# Patient Record
Sex: FEMALE
[De-identification: names, ages, dates, MRNs, and addresses within clinical notes are randomized per-mention and may not be internally consistent; named-entity substitution may affect disease eponyms.]

---

## 2018-12-29 ENCOUNTER — HOSPITAL ENCOUNTER (EMERGENCY)
Dept: HOSPITAL 42 - ED | Age: 23
Discharge: HOME | End: 2018-12-29
Payer: MEDICAID

## 2018-12-29 VITALS — HEART RATE: 73 BPM | DIASTOLIC BLOOD PRESSURE: 78 MMHG | OXYGEN SATURATION: 99 % | SYSTOLIC BLOOD PRESSURE: 110 MMHG

## 2018-12-29 VITALS — TEMPERATURE: 97.9 F | RESPIRATION RATE: 18 BRPM

## 2018-12-29 DIAGNOSIS — O03.9: Primary | ICD-10-CM

## 2018-12-29 LAB
ALBUMIN SERPL-MCNC: 4.3 G/DL (ref 3–4.8)
ALBUMIN/GLOB SERPL: 1.4 {RATIO} (ref 1.1–1.8)
ALT SERPL-CCNC: 27 U/L (ref 7–56)
APPEARANCE UR: CLEAR
APTT BLD: 27.9 SECONDS (ref 25.1–36.5)
AST SERPL-CCNC: 17 U/L (ref 14–36)
BASOPHILS # BLD AUTO: 0.01 K/MM3 (ref 0–2)
BASOPHILS NFR BLD: 0.1 % (ref 0–3)
BILIRUB UR-MCNC: NEGATIVE MG/DL
BUN SERPL-MCNC: 9 MG/DL (ref 7–21)
CALCIUM SERPL-MCNC: 9 MG/DL (ref 8.4–10.5)
COLOR UR: YELLOW
EOSINOPHIL # BLD: 0.1 10*3/UL (ref 0–0.7)
EOSINOPHIL NFR BLD: 1.7 % (ref 1.5–5)
ERYTHROCYTE [DISTWIDTH] IN BLOOD BY AUTOMATED COUNT: 12.8 % (ref 11.5–14.5)
GFR NON-AFRICAN AMERICAN: > 60
GLUCOSE UR STRIP-MCNC: NEGATIVE MG/DL
GRANULOCYTES # BLD: 5.01 10*3/UL (ref 1.4–6.5)
GRANULOCYTES NFR BLD: 71.6 % (ref 50–68)
HCG,QUALITATIVE URINE: POSITIVE
HGB BLD-MCNC: 12.9 G/DL (ref 12–16)
INR PPP: 1.41
LEUKOCYTE ESTERASE UR-ACNC: NEGATIVE LEU/UL
LYMPHOCYTES # BLD: 1.4 10*3/UL (ref 1.2–3.4)
LYMPHOCYTES NFR BLD AUTO: 20.6 % (ref 22–35)
MCH RBC QN AUTO: 29.5 PG (ref 25–35)
MCHC RBC AUTO-ENTMCNC: 33.4 G/DL (ref 31–37)
MCV RBC AUTO: 88.3 FL (ref 80–105)
MONOCYTES # BLD AUTO: 0.4 10*3/UL (ref 0.1–0.6)
MONOCYTES NFR BLD: 6 % (ref 1–6)
PH UR STRIP: 6 [PH] (ref 4.7–8)
PLATELET # BLD: 200 10^3/UL (ref 120–450)
PMV BLD AUTO: 10.3 FL (ref 7–11)
PROT UR STRIP-MCNC: (no result) MG/DL
PROTHROMBIN TIME: 16.3 SECONDS (ref 9.4–12.5)
RBC # BLD AUTO: 4.37 10^6/UL (ref 3.5–6.1)
RBC # UR STRIP: (no result) /UL
RBC #/AREA URNS HPF: (no result) /HPF (ref 0–2)
SP GR UR STRIP: >= 1.03 (ref 1–1.03)
UROBILINOGEN UR STRIP-ACNC: 0.2 E.U./DL
WBC # BLD AUTO: 7 10^3/UL (ref 4.5–11)

## 2018-12-29 PROCEDURE — 99281 EMR DPT VST MAYX REQ PHY/QHP: CPT

## 2018-12-29 PROCEDURE — 85610 PROTHROMBIN TIME: CPT

## 2018-12-29 PROCEDURE — 81001 URINALYSIS AUTO W/SCOPE: CPT

## 2018-12-29 PROCEDURE — 84703 CHORIONIC GONADOTROPIN ASSAY: CPT

## 2018-12-29 PROCEDURE — 86900 BLOOD TYPING SEROLOGIC ABO: CPT

## 2018-12-29 PROCEDURE — 86850 RBC ANTIBODY SCREEN: CPT

## 2018-12-29 PROCEDURE — 85025 COMPLETE CBC W/AUTO DIFF WBC: CPT

## 2018-12-29 PROCEDURE — 85730 THROMBOPLASTIN TIME PARTIAL: CPT

## 2018-12-29 PROCEDURE — 76817 TRANSVAGINAL US OBSTETRIC: CPT

## 2018-12-29 PROCEDURE — 80053 COMPREHEN METABOLIC PANEL: CPT

## 2018-12-29 PROCEDURE — 84702 CHORIONIC GONADOTROPIN TEST: CPT

## 2018-12-29 NOTE — US
Date of service: 



12/29/2018



PROCEDURE:  OB Pelvic Ultrasound



HISTORY:

pregnant and bleeding



LMP: 11/05/2018



COMPARISON:

None available.



FINDINGS:



UTERUS:

Uterus measures 8.5 x 4.0 x 4.6 cm. Normal in size and appearance.  

No intrauterine gestational sac.  Endometrium measures 0.9 cm. 



CERVIX:

Long and closed. No cervical abnormality seen.



RIGHT OVARY:

Measures 3.6 x 2.7 x 2.9 cm. No mass lesion. Normal flow. 



LEFT OVARY:

Measures 3.5 x 1.9 x 2.6 cm. No solid mass. Normal flow. 



FREE FLUID:

None.



OTHER FINDINGS:

None. 



IMPRESSION:

No evidence of intrauterine gestation.  Findings may represent early 

normal/abnormal pregnancy with ectopic pregnancy not excluded.  Close 

clinical follow-up with serial pelvic sonography and serum beta HCG 

levels is recommended.

## 2018-12-29 NOTE — ED PDOC
Arrival/HPI





- General


Chief Complaint: Abdominal Pain


Time Seen by Provider: 18 08:43


Historian: Patient





- History of Present Illness


Narrative History of Present Illness (Text): 


18 08:47


Tammy Brown is a 23 year old female, with no significant past medical 

history,  P:0, currently 6 weeks pregnant, who presents to the Emergency 

department for vaginal spotting. Patient [place] [facility] [place] for vaginal bleeding, had a 

transvaginal US performed, and was discharged/advised to follow-up with OBGYN. 

Patient states she continues to have some vaginal spotting with associated mild 

lower abdominal cramping, became concerned, and came in for further evaluation. 

Patient denies any fever, nausea, vomiting, urinary symptoms, back pain, 

headache, dizziness, or any other complaints.


Symptom Onset: Gradual


Symptom Course: Unchanged


Activities at Onset: Light


Context: Home





Past Medical History





- Provider Review


Nursing Documentation Reviewed: Yes





- Psychiatric


Hx Substance Use: No





Family/Social History





- Physician Review


Nursing Documentation Reviewed: Yes


Family/Social History: Unknown Family HX


Smoking Status: Former Smoker


Hx Alcohol Use: Yes


Frequency of alcohol use: Socially


Hx Substance Use: No





Allergies/Home Meds


Allergies/Adverse Reactions: 


Allergies





No Known Allergies Allergy (Verified 18 08:40)


   








Home Medications: 


                                    Home Meds











 Medication  Instructions  Recorded  Confirmed


 


RX: No Known Home Med  18














Review of Systems





- Physician Review


All systems were reviewed & negative as marked: Yes





- Review of Systems


Constitutional: Normal.  absent: Fevers


Eyes: Normal


ENT: Normal


Respiratory: Normal.  absent: SOB, Cough


Cardiovascular: Normal.  absent: Chest Pain


Gastrointestinal: Abdominal Pain (+mild lower abdominal cramping).  absent: 

Diarrhea, Nausea, Vomiting


Genitourinary Female: Vaginal Bleeding


Musculoskeletal: Normal.  absent: Back Pain, Neck Pain


Skin: Normal.  absent: Rash


Neurological: Normal.  absent: Headache, Dizziness


Endocrine: Normal


Hemo/Lymphatic: Normal


Psychiatric: Normal





Physical Exam


Vital Signs Reviewed: Yes





Vital Signs











  Temp Pulse Resp BP Pulse Ox


 


 18 08:38  97.9 F  80  18  109/76  97











Temperature: Afebrile


Blood Pressure: Normal


Pulse: Regular


Respiratory Rate: Normal


Appearance: Positive for: Well-Appearing, Non-Toxic, Comfortable


Pain Distress: None


Mental Status: Positive for: Alert and Oriented X 3





- Systems Exam


Head: Present: Atraumatic, Normocephalic


Pupils: Present: PERRL


Extroacular Muscles: Present: EOMI


Conjunctiva: Present: Normal


Mouth: Present: Moist Mucous Membranes


Neck: Present: Normal Range of Motion


Respiratory/Chest: Present: Clear to Auscultation, Good Air Exchange.  No: 

Respiratory Distress, Accessory Muscle Use


Cardiovascular: Present: Regular Rate and Rhythm, Normal S1, S2.  No: Murmurs


Abdomen: No: Tenderness, Distention, Peritoneal Signs


Back: Present: Normal Inspection


Upper Extremity: Present: Normal Inspection.  No: Cyanosis, Edema


Lower Extremity: Present: Normal Inspection.  No: Edema


Neurological: Present: GCS=15, CN II-XII Intact, Speech Normal


Skin: Present: Warm, Dry, Normal Color.  No: Rashes


Psychiatric: Present: Alert, Oriented x 3, Normal Insight, Normal Concentration





Medical Decision Making


ED Course and Treatment: 


18 08:47


Impression:


23 year old female complaining of vaginal spotting and mild lower abdominal 

cramping.





Plan:


-- Transvaginal US


-- Labs, Beta-HCG, blood type and screen


-- Urinalysis


-- IV fluids


-- Reassess and disposition





Progress Notes:


18 09:53


Transvaginal US:


UTERUS:


Uterus measures 8.5 x 4.0 x 4.6 cm. Normal in size and appearance.  No 

intrauterine gestational sac.  Endometrium measures 0.9 cm. 


CERVIX:


Long and closed. No cervical abnormality seen.


RIGHT OVARY:


Measures 3.6 x 2.7 x 2.9 cm. No mass lesion. Normal flow. 


LEFT OVARY:


Measures 3.5 x 1.9 x 2.6 cm. No solid mass. Normal flow. 


FREE FLUID:


None.


OTHER FINDINGS:


None. 


IMPRESSION:


No evidence of intrauterine gestation.  Findings may represent early normal/a

bnormal pregnancy with ectopic pregnancy not excluded.  Close clinical follow-up

with serial pelvic sonography and serum beta HCG levels is recommended.


Sam Polo MD


Report Date : 2018 09:51:32





18 09:59


Case discussed with MARCIN Aleman on call, who is aware and agrees with 

plan. States pt can be discharged home with outpt f/u.





18 10:27


On re-evaluation, patient feels better and is in no acute distress. Discussed 

results patient, who expresses understanding. Patient refusing pelvic exam. 





Patient is stable for discharge. Patient was instructed to follow up with 

OBGYN/physician or return if symptoms worsen or new concerning symptoms arise.





18 10:41


pt with resutls from previous us. beta 1800 iup confirme.d today beta 500, no 

iup suspect complete ab. refuses pelvic. advise outpt fu. 





- Lab Interpretations


I have reviewed the lab results: Yes





- RAD Interpretation


Radiology Orders: 











18 08:44


TRANSVAGINAL [US] Stat 











: Radiologist





- Medication Orders


Current Medication Orders: 











Sodium Chloride (Sodium Chloride 0.9%)  500 mls @ 1,000 mls/hr IV .Q30M STA


   Stop: 18 09:13











- Scribe Statement


The provider has reviewed the documentation as recorded by the Ronyibzach Duncan





Provider Scribe Attestation:


All medical record entries made by the Scribe were at my direction and 

personally dictated by me. I have reviewed the chart and agree that the record 

accurately reflects my personal performance of the history, physical exam, 

medical decision making, and the department course for this patient. I have also

 personally directed, reviewed, and agree with the discharge instructions and 

disposition.








Disposition/Present on Arrival





- Present on Arrival


Any Indicators Present on Arrival: No


History of DVT/PE: No


History of Uncontrolled Diabetes: No


Urinary Catheter: No


History of Decub. Ulcer: No


History Surgical Site Infection Following: None





- Disposition


Have Diagnosis and Disposition been Completed?: Yes


Diagnosis: 


 Complete miscarriage





Disposition: HOME/ ROUTINE


Disposition Time: 10:30


Patient Problems: 


                             Current Active Problems











Problem Status Onset


 


Complete miscarriage Acute 











Condition: STABLE


Discharge Instructions (ExitCare):  Miscarriage


Additional Instructions: 


follow up with obgyn, return to er with worsening symptoms or concerns. 


Referrals: 


Sugey Charles MD [Primary Care Provider] - Follow up with primary


Ovidio Carbajal [Medical Doctor] - Follow up with primary


Forms:  2sms (English)